# Patient Record
(demographics unavailable — no encounter records)

---

## 2024-10-07 NOTE — HISTORY OF PRESENT ILLNESS
[FreeTextEntry1] : -Magaly Munoz for f/u visit.  Last seen July 2024  s/p stelara infusion on august 27 2024 -- Per patient and infusion center notes, patient developed nausea and flushing after infusion was initially started.  She was then given famotidine and IV Benadryl.  Felt better after about 15 minutes.  Infusion was then restarted at a slower rate which she tolerated well. She feels that she had an improvement in both abdominal pain and bowel movements after the Stelara infusion.  She has been following with IBD ZANDER Sue.  Feels that lifestyle modifications have helped both with her heartburn and constipation.  Bowel movements typically occur every other day to every 2 days. Stool is typically formed, no straining.  She has mild to diffuse abdominal pain, this has improved.  She denies fevers, chills, nausea, vomiting, rashes  ------------------------------------------------------------- IBD Hx The patient was diagnosed with crohns disease in 2016. Initially presented with anemia, constipation. EGD 2016 - esophagitis, mild gastritis Colonoscopy 2016 - small apthous ulcers in the TI, internal hemorrhoids, otherwise normal colon VCE 2016 - [facility]bleeding [facility] mostly in TI She was started on Pentasa and then lost to f/u  She re-established care in Jan 2024 with ANTON, abdominal pain, constipation and diarrhea EGD April 2024 - LA Grade B esophagitis Cscope 2024 - fair prep, normal colon, aphthous ulcers in proximal ileum CRP <3, fecal debbi 50 Started Stelara on August 27, 2024  Fhx - aunt w colon cancer in her 50s No fhx of IBD

## 2024-10-07 NOTE — ASSESSMENT
[FreeTextEntry1] : 41F, legally blind / hearing impaired, ANTON, Crohns Disease, referred by Dr York for further management.  # Crohns Disease - Initially dx in 2016 w small bowel Crohns disease - Treated only w pentasa in the past and then lost to f/u. - EGD April 2024 - LA Grade B esophagitis. Path - normal small bowel, mild inactive gastritis, negative for IM and HP. - Cscope 2024 - fair prep, normal colon, aphthous ulcers in proximal ileum. Path - very minimal superficial active ileitis and adjacent reactive epithelial changes - CRP <3, fecal debbi 50 - VCE - ?abnormality at ampulla, small shallow ulcers at small bowel, ulceration and inflammation - Stelara infusion on August 27, initially had a reaction with flushing and nausea.  Tolerated infusion after switching to a slower rate.  Long discussion today with patient regarding risks/benefits.  She would like to try Stelara injection as she was able to tolerate the infusion at a slower rate.  Agree with premedication with cetirizine prior to injection. - Patient will send me an email once she receives her Stelara injection so that we may schedule a teaching session in the office.  # Abnl imaging- VCE #Esophagitis VCE w abnormality at ?ampulla S/p EGD/EUS-normal exam, no pathology at ampulla, normal esophagus with resolution of esophagitis  # ANTON - Following with heme/onc, plan for IV iron infusions  # Heartburn # Esophagitis on EGD Aprl 2024 - [facility] done [facility] shows normal esophagus - Heartburn symptoms have significantly improved after starting lifestyle modifications - Okay to use as needed H2 blocker - avoid nsaids  RTC after stelara injection

## 2024-11-01 NOTE — ASSESSMENT
[FreeTextEntry1] :  Patient present today for Stelara injection. Pt present with face-to-face . Patient states she is doing well. Sub-q injection administered to right side of abdomen. Pt waited in office for 20 mins before leaving. No .ADR noted, tolerated well.  Denies pain or discomfort, no signs of distress noted. Patient was instructed to call the office with any questions or concerns.  Next injection due in 8 weeks, appointment made. Pt verbalized understanding.

## 2024-11-07 NOTE — REASON FOR VISIT
[Initial Consultation] : an initial consultation for [Other: ______] : provided by LEON [FreeTextEntry2] : Iron deficiency anemia  [Interpreters_IDNumber] : [id number] [Interpreters_FullName] : katie

## 2024-11-07 NOTE — ASSESSMENT
[FreeTextEntry1] : 40 yo F presented on 8/1/24 for the initial consultation for iron deficiency anemia. 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 Ferritin 4 on presentation (5/13/24) with a hemoglobin of 8.   She is unable to tolerate oral iron and needed IV iron infusion. She is now s/p IV Feraheme 510 mg x 2 in August 2024. Hemoglobin is improved to normal, still with hair loss complaints.   Plan  - Will recheck ferritin and iron.  - Possible if ferritin is not fully >50 another dose of Feraheme can benefit her hair growth.  - Patient understands and agrees with plan. All information explained to the best of my ability.  - RTC in 6 months.

## 2024-11-07 NOTE — PHYSICAL EXAM
[Ambulatory and capable of all self care but unable to carry out any work activities] : Status 2- Ambulatory and capable of all self care but unable to carry out any work activities. Up and about more than 50% of waking hours [Normal] : affect appropriate [de-identified] : bilateral blindness;  [de-identified] : hearing loss

## 2024-11-07 NOTE — HISTORY OF PRESENT ILLNESS
[de-identified] : Laura Sow is a 41 yo F presented on 8/1/24 for the initial consultation for iron deficiency anemia.   PMHx crohn's disease diagnosed in 2016. 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 -Initially presented with anemia, constipation.  -EGD 2016 - esophagitis, mild gastritis -Colonoscopy 2016 - small apthous ulcers in the Terminal ileum TI, internal hemorrhoids, otherwise normal colon -Video capsule endoscopy 2016 - multiple scattered nonbleeding apthous ulcers throughout small bowel, mostly in TI -She was started on mesalamine for short period time then lost to f/u; She re-established care with GI Blake Page  in Jan 2024 with ANTON, abdominal pain, constipation and diarrhea -EGD April 2024 - LA Grade B esophagitis -Colonoscopy 2024 - normal colon, aphthous ulcers in proximal ileum -lab 1/24/24 CRP <3, Cr 0.6 LFTs wnl Tbil 0.6  -2/13/24 fecal calprotectin 50ug/g wnl  -Ferritin 4 =5/13/24 <-5=8/2/23 <-28.9= 8/18/2016<-5=1/28/2016 -iron 16 =5/13/24 -CBC Hb 8 MCV 74.9 WBC 3.8 Plt 298 on 5/23/24   Aug 21 she will f/u with GI for repeat Endoscopy. She reports that she stopped iron oral supplements about 3 wks ago due to abdominal distention/pain, severe constipation, and occasional nausea/vomiting. She admits fatigue, lightheaded, intermittent postprandial mid chest and upper abd burning and discomfort, ice craving, hair loss,   Currently she denies n/v/diarrhea. She denies epistaxis,changes with urination, SOB at rest, chest pain, headache, bone pain/joint pain. She does not know if she has melena and/or hematochezia due to blindness.  Periods are regular and last 2-4 days which is not heavy.   FHx aunt w colon cancer in her 50s. parents are maternal cousins.  SHx She has one adopted sister. denies cigarette, Social ETOH use.  and has two children one daughter and one son.  is losing hearing.  Allergy Dust/pollen/bleach/sea food/cats   [de-identified] : Patient seen for follow up on 11/07/2024. Feeling much better from the standpoint of her fatigue, but still complains of intermittent hair loss.

## 2024-11-07 NOTE — REVIEW OF SYSTEMS
[Fatigue] : fatigue [Vision Problems] : vision problems [SOB on Exertion] : shortness of breath during exertion [Abdominal Pain] : abdominal pain [Constipation] : constipation [Diarrhea: Grade 0] : Diarrhea: Grade 0 [Muscle Pain] : muscle pain [Dizziness] : dizziness [Muscle Weakness] : muscle weakness [Negative] : Allergic/Immunologic [Fever] : no fever [Chills] : no chills [Night Sweats] : no night sweats [Recent Change In Weight] : ~T no recent weight change [Chest Pain] : no chest pain [Palpitations] : no palpitations [Leg Claudication] : no intermittent leg claudication [Lower Ext Edema] : no lower extremity edema [Shortness Of Breath] : no shortness of breath [Wheezing] : no wheezing [Cough] : no cough [Vomiting] : no vomiting [Joint Pain] : no joint pain [Joint Stiffness] : no joint stiffness [Confused] : no confusion [Fainting] : no fainting [Difficulty Walking] : no difficulty walking [Proptosis] : no proptosis [Hot Flashes] : no hot flashes [Deepening Of The Voice] : no deepening of the voice [FreeTextEntry3] : blindness  [FreeTextEntry7] : intermittent postprandial abd pain/discomfort

## 2024-12-09 NOTE — ASSESSMENT
[FreeTextEntry1] : 41F, legally blind / hearing impaired, ANTON, Crohns Disease, referred by Dr York for further management.  # Crohns Disease - small bowel  - Initially dx in 2016 w small bowel Crohns disease - Treated only w pentasa in the past and then lost to f/u. - EGD April 2024 - LA Grade B esophagitis. Path - normal small bowel, mild inactive gastritis, negative for IM and HP. - Cscope 2024 - fair prep, normal colon, aphthous ulcers in proximal ileum. Path - very minimal superficial active ileitis and adjacent reactive epithelial changes - VCE - ?abnormality at ampulla, small shallow ulcers at small bowel, ulceration and inflammation - EGD/EUS August 2024 -normal exam, no pathology at ampulla, normal esophagus with resolution of esophagitis - continue stelara   # Preventative care  - name/numbers for derm office given to patient, she will make appt  - follow up w pcp re vaccines  - GYN - reports appt in June 2024   # ANTON - Following with heme/onc, s/p iron infusions  - Nov 2024 labs - hgb 12.9, iron 66, ferritin 44  # Heartburn - Heartburn symptoms have significantly improved after starting lifestyle modifications - Okay to use as needed H2 blocker - avoid nsaids  RTC after 3 mos

## 2024-12-09 NOTE — PHYSICAL EXAM
[Abdomen Tenderness] : non-tender [Abdomen Soft] : soft [Normal] : oriented to person, place, and time no abdominal pain, no bloating, no constipation, no diarrhea, no nausea and no vomiting.

## 2024-12-09 NOTE — HISTORY OF PRESENT ILLNESS
[FreeTextEntry1] :  --  Magaly   Here for f/u visit Last seen October 2024 s/p 1st stelara maintenance injection on Nov 1, 2024   Feels well  Has noticed significant improvement in abd pain since starting stelara  Bowel movements are every 1-2 days  Normal stool, occasional hard stools No blood in stool  Denies rashes, joint pain, wt loss   Acid reflux very minimal Takes pepcid prn if having any symptoms  Denies n/v, dysphagia, odynophagia  ------------------------------------------------------------- IBD Hx The patient was diagnosed with crohns disease in 2016. Initially presented with anemia, constipation. EGD 2016 - esophagitis, mild gastritis Colonoscopy 2016 - small apthous ulcers in the TI, internal hemorrhoids, otherwise normal colon VCE 2016 - multiple scattered [facility]bleeding [facility], mostly in TI She was started on Pentasa and then lost to f/u  She re-established care in Jan 2024 with ANTON, abdominal pain, constipation and diarrhea EGD April 2024 - LA Grade B esophagitis Cscope 2024 - fair prep, normal colon, aphthous ulcers in proximal ileum CRP <3, fecal debbi 50 Started Stelara on August 27, 2024  Fhx - aunt w colon cancer in her 50s No fhx of IBD

## 2025-03-05 NOTE — HISTORY OF PRESENT ILLNESS
[FreeTextEntry1] : Laura presents for follow-up visit.  She is accompanied by .  She has a history of Crohn's disease and has been started on Stelara since August 2024.  She complains of abdominal pain, gas, bloating and constipation.  She can go for a week at a time without bowel movements.  She tried drinking more water eating more fiber without response.  She is more concerned about the bloating and the constipation.  She has been seen by hematology with resolution of her iron deficiency anemia.

## 2025-03-05 NOTE — PHYSICAL EXAM

## 2025-05-08 NOTE — HEALTH RISK ASSESSMENT
[Yes] : Yes [Monthly or less (1 pt)] : Monthly or less (1 point) [1 or 2 (0 pts)] : 1 or 2 (0 points) [Never (0 pts)] : Never (0 points) [No] : In the past 12 months have you used drugs other than those required for medical reasons? No [1] : 1) Little interest or pleasure doing things for several days (1) [0] : 2) Feeling down, depressed, or hopeless: Not at all (0) [PHQ-2 Positive] : PHQ-2 Positive [Never] : Never [Audit-CScore] : 1 [[id number]] : 1

## 2025-05-08 NOTE — ASSESSMENT
[FreeTextEntry1] : 40 y/o female with PMH of Retinitis Pigmentosa (Deaf and partial blindness), Crohn's Disease, anemia, IBS presents for follow-up. Accompanied by .   IBS: Given Linzess by GI but no longer taking as concerned about side effects  Chron's disease: on stelara . Feels like her vision has gotten worse since the injections and having increased abdominal pain. Advised to schedule sooner appointment with gastroenterology to discuss further. Patient also seeing ophthalmology regularly   Anemia: iron infusions with heme in the past, has follow up scheduled soon   Does feel like she has some anxiety and depression but not impacting her life on a day-to-day basis. Overall maintains a positive outlook.   HCM:   Mammo due Aug 2025, will get referral from gynecologist   A1C and lipid, vitamin D today Pap due 2026  tdap in 2014, will go to pharmacy for repeat vaccine   Follow up in four months for CPE, sooner if needed   Daysi Dumont MD

## 2025-05-08 NOTE — PHYSICAL EXAM
[Normal Oropharynx] : the oropharynx was normal [No Lymphadenopathy] : no lymphadenopathy [Normal Rate] : normal rate  [Regular Rhythm] : with a regular rhythm [No Edema] : there was no peripheral edema [Soft] : abdomen soft [Non Tender] : non-tender [Non-distended] : non-distended [Normal] : affect was normal and insight and judgment were intact

## 2025-05-08 NOTE — END OF VISIT
[FreeTextEntry3] : 45 minutes of total time was spent on the date of the encounter. This included time for review of medical records and laboratory results, face to face time (including the physical examination counseling and coordination of care), time for patient education, treatment plans, answering questions, communicating with other doctors and for medical record documentation.  The time spent is separate from time spent on separately billed procedures.      [Time Spent: ___ minutes] : I have spent [unfilled] minutes of time on the encounter which excludes teaching and separately reported services.

## 2025-05-08 NOTE — INTERPRETER SERVICES
[Other: ______] : provided by LEON [Time Spent: ____ minutes] : Total time spent using  services: [unfilled] minutes. The patient's primary language is not English thus required  services. [Interpreters_FullName] : Rimma [TWNoteComboBox1] : American Sign Language

## 2025-05-08 NOTE — HISTORY OF PRESENT ILLNESS
[FreeTextEntry1] : Follow up  [de-identified] : 40 y/o female with PMH of Retinitis Pigmentosa (Deaf and partial blindness), Crohn's Disease, anemia, IBS presents for follow-up. Accompanied by .   IBS: Given Linzess by GI but no longer taking as concerned about side effects  Chron's disease: on stelara . Feels like her vision has gotten worse since the injections and having increased abdominal pain. Advised to schedule sooner appointment with gastroenterology to discuss further. Patient also seeing ophthalmology regularly   Anemia: iron infusions with heme in the past, has follow up scheduled soon   Does feel like she has some anxiety and depression but not impacting her life on a day-to-day basis. Overall maintains a positive outlook.   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

## 2025-06-03 NOTE — PHYSICAL EXAM
Nursing Note by Bobbi Faloln RN at 03/08/17 04:58 PM     Author:  Bobbi Fallon RN Service:  (none) Author Type:  Registered Nurse     Filed:  03/08/17 04:59 PM Encounter Date:  3/8/2017 Status:  Signed     :  Bobbi Fallon RN (Registered Nurse)            Patient was triaged after name and birth date were verified. Wait time explained to patient and patient was returned to waiting room in stable condition until patient room was available. Electronically Signed by:    Bobbi Fallon RN , 3/8/2017  Eddi Schrader was offered treatment for pain control:[LD1.1T] Yes.  Patient refused comfort measures to reduce pain.  Patient denied any eye injury.  Vision acuity with glasses: right eye 20/30 and left eye 20/20.[LD1.1M]    Last visit with MARIBETH QUEZADA was on 08/15/2005 at  4:55 [facility]  3:10 PM [facility]  Match done based on reference date of today 3/8/17[LD1.1T]            Revision History        User Key Date/Time User Provider Type Action    > LD1.1 03/08/17 04:59 PM Bobbi Fallon RN Registered Nurse Sign    M - Manual, T - Template             [Ambulatory and capable of all self care but unable to carry out any work activities] : Status 2- Ambulatory and capable of all self care but unable to carry out any work activities. Up and about more than 50% of waking hours [Normal] : affect appropriate [de-identified] : bilateral blindness;  [de-identified] : hearing loss

## 2025-06-03 NOTE — REVIEW OF SYSTEMS
[Fever] : no fever [Chills] : no chills [Night Sweats] : no night sweats [Fatigue] : fatigue [Recent Change In Weight] : ~T no recent weight change [Vision Problems] : vision problems [Chest Pain] : no chest pain [Palpitations] : no palpitations [Leg Claudication] : no intermittent leg claudication [Lower Ext Edema] : no lower extremity edema [Shortness Of Breath] : no shortness of breath [Wheezing] : no wheezing [Cough] : no cough [SOB on Exertion] : shortness of breath during exertion [Abdominal Pain] : abdominal pain [Vomiting] : no vomiting [Constipation] : constipation [Diarrhea: Grade 0] : Diarrhea: Grade 0 [Joint Pain] : no joint pain [Joint Stiffness] : no joint stiffness [Muscle Pain] : muscle pain [Confused] : no confusion [Dizziness] : dizziness [Fainting] : no fainting [Difficulty Walking] : no difficulty walking [Proptosis] : no proptosis [Hot Flashes] : no hot flashes [Muscle Weakness] : muscle weakness [Deepening Of The Voice] : no deepening of the voice [Negative] : Allergic/Immunologic [FreeTextEntry3] : blindness  [FreeTextEntry7] : intermittent postprandial abd pain/discomfort

## 2025-06-03 NOTE — REASON FOR VISIT
[Initial Consultation] : an initial consultation for [Other: ______] : provided by LEON [FreeTextEntry2] : Iron deficiency anemia  [Interpreters_IDNumber] : [id number] [Interpreters_FullName] : katie

## 2025-06-03 NOTE — ASSESSMENT
[FreeTextEntry1] : 42 yo F presented on 8/1/24 for the initial consultation for iron deficiency anemia. 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 Ferritin 4 on presentation (5/13/24) with a hemoglobin of 8.   She is unable to tolerate oral iron and needed IV iron infusion. She is now s/p IV Feraheme 510 mg x 2 in August 2024. Hemoglobin improved initially, but now dropping again.   Plan  - Will recheck ferritin and iron. Likely deficient again, will arrange for her to have Feraheme 510 mg IV x 2 again.  - She will likely require this on an ongoing basis. Will plan for shorter term follow up for now, and eventually attempt to build a schedule of IV iron repletion while monitoring ferritin on an ongoing basis.  - Patient understands and agrees with plan. All information explained to the best of my ability.  - RTC in 3 months.

## 2025-06-03 NOTE — HISTORY OF PRESENT ILLNESS
[de-identified] : Laura Sow is a 41 yo F presented on 8/1/24 for the initial consultation for iron deficiency anemia.   PMHx crohn's disease diagnosed in 2016. 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 -Initially presented with anemia, constipation.  -EGD 2016 - esophagitis, mild gastritis -Colonoscopy 2016 - small apthous ulcers in the Terminal ileum TI, internal hemorrhoids, otherwise normal colon -Video capsule endoscopy 2016 - multiple scattered nonbleeding apthous ulcers throughout small bowel, mostly in TI -She was started on mesalamine for short period time then lost to f/u; She re-established care with GI Blake Page  in Jan 2024 with ANTON, abdominal pain, constipation and diarrhea -EGD April 2024 - LA Grade B esophagitis -Colonoscopy 2024 - normal colon, aphthous ulcers in proximal ileum -lab 1/24/24 CRP <3, Cr 0.6 LFTs wnl Tbil 0.6  -2/13/24 fecal calprotectin 50ug/g wnl  -Ferritin 4 =5/13/24 <-5=8/2/23 <-28.9= 8/18/2016<-5=1/28/2016 -iron 16 =5/13/24 -CBC Hb 8 MCV 74.9 WBC 3.8 Plt 298 on 5/23/24   Aug 21 she will f/u with GI for repeat Endoscopy. She reports that she stopped iron oral supplements about 3 wks ago due to abdominal distention/pain, severe constipation, and occasional nausea/vomiting. She admits fatigue, lightheaded, intermittent postprandial mid chest and upper abd burning and discomfort, ice craving, hair loss,   Currently she denies n/v/diarrhea. She denies epistaxis,changes with urination, SOB at rest, chest pain, headache, bone pain/joint pain. She does not know if she has melena and/or hematochezia due to blindness.  Periods are regular and last 2-4 days which is not heavy.   FHx aunt w colon cancer in her 50s. parents are maternal cousins.  SHx She has one adopted sister. denies cigarette, Social ETOH use.  and has two children one daughter and one son.  is losing hearing.  Allergy Dust/pollen/bleach/sea food/cats   [de-identified] : Patient seen for follow up on 06/03/2025. She noted she has noticed worsening fatigue and recurrent PICA symptoms. Hemoglobin today is 9.7.

## 2025-06-25 NOTE — DISCUSSION/SUMMARY
[FreeTextEntry1] : A - WWV      Retinitis Pigmentosa     Crohn's disease       Anemia  P- f/u 1 year     pap next  due in 2026    referral for mammo/breast sono given   pt goes for regular colorectal screening with G.I. for Crohn's  ( see chart)

## 2025-06-25 NOTE — PHYSICAL EXAM
[MA] : MA [Appropriately responsive] : appropriately responsive [Alert] : alert [No Acute Distress] : no acute distress [No Lymphadenopathy] : no lymphadenopathy [Regular Rate Rhythm] : regular rate rhythm [No Murmurs] : no murmurs [Clear to Auscultation B/L] : clear to auscultation bilaterally [Soft] : soft [Non-tender] : non-tender [Non-distended] : non-distended [No HSM] : No HSM [No Lesions] : no lesions [No Mass] : no mass [Oriented x3] : oriented x3 [Examination Of The Breasts] : a normal appearance [Normal] : normal [No Masses] : no breast masses were palpable [Anteversion] : anteverted [FreeTextEntry2] : Sarah